# Patient Record
Sex: MALE | Race: WHITE | Employment: OTHER | URBAN - METROPOLITAN AREA
[De-identification: names, ages, dates, MRNs, and addresses within clinical notes are randomized per-mention and may not be internally consistent; named-entity substitution may affect disease eponyms.]

---

## 2017-02-08 PROBLEM — E11.9 DIABETES MELLITUS TYPE 2 WITHOUT RETINOPATHY (HCC): Status: ACTIVE | Noted: 2017-02-08

## 2017-05-15 PROCEDURE — 86200 CCP ANTIBODY: CPT | Performed by: INTERNAL MEDICINE

## 2017-05-18 PROCEDURE — 87070 CULTURE OTHR SPECIMN AEROBIC: CPT | Performed by: SURGERY

## 2017-05-18 PROCEDURE — 87075 CULTR BACTERIA EXCEPT BLOOD: CPT | Performed by: SURGERY

## 2017-05-18 PROCEDURE — 82570 ASSAY OF URINE CREATININE: CPT | Performed by: INTERNAL MEDICINE

## 2017-05-18 PROCEDURE — 87186 SC STD MICRODIL/AGAR DIL: CPT | Performed by: SURGERY

## 2017-05-18 PROCEDURE — 82043 UR ALBUMIN QUANTITATIVE: CPT | Performed by: INTERNAL MEDICINE

## 2017-05-18 PROCEDURE — 87205 SMEAR GRAM STAIN: CPT | Performed by: SURGERY

## 2017-05-18 PROCEDURE — 87077 CULTURE AEROBIC IDENTIFY: CPT | Performed by: SURGERY

## 2017-06-08 PROCEDURE — 81015 MICROSCOPIC EXAM OF URINE: CPT | Performed by: UROLOGY

## 2017-09-12 ENCOUNTER — OFFICE VISIT (OUTPATIENT)
Dept: FAMILY MEDICINE CLINIC | Facility: CLINIC | Age: 57
End: 2017-09-12

## 2017-09-12 ENCOUNTER — HOSPITAL ENCOUNTER (OUTPATIENT)
Age: 57
Discharge: HOME | End: 2017-09-12
Attending: FAMILY MEDICINE
Payer: COMMERCIAL

## 2017-09-12 ENCOUNTER — APPOINTMENT (OUTPATIENT)
Dept: GENERAL RADIOLOGY | Age: 57
End: 2017-09-12
Attending: FAMILY MEDICINE
Payer: COMMERCIAL

## 2017-09-12 VITALS
SYSTOLIC BLOOD PRESSURE: 171 MMHG | BODY MASS INDEX: 34.07 KG/M2 | DIASTOLIC BLOOD PRESSURE: 90 MMHG | WEIGHT: 230 LBS | HEIGHT: 69 IN | HEART RATE: 92 BPM | OXYGEN SATURATION: 96 % | TEMPERATURE: 98 F | RESPIRATION RATE: 18 BRPM

## 2017-09-12 DIAGNOSIS — Z02.9 ENCOUNTERS FOR ADMINISTRATIVE PURPOSE: Primary | ICD-10-CM

## 2017-09-12 DIAGNOSIS — M17.11 ARTHRITIS OF KNEE, RIGHT: ICD-10-CM

## 2017-09-12 DIAGNOSIS — Z96.642 HISTORY OF TOTAL LEFT HIP REPLACEMENT: ICD-10-CM

## 2017-09-12 DIAGNOSIS — Z98.890 S/P SHOULDER SURGERY: ICD-10-CM

## 2017-09-12 DIAGNOSIS — M25.461 KNEE EFFUSION, RIGHT: Primary | ICD-10-CM

## 2017-09-12 LAB
#LYMPHOCYTE IC: 2.5 X10ˆ3/UL (ref 0.9–3.2)
#MXD IC: 1.3 X10ˆ3/UL (ref 0.1–1)
#NEUTROPHIL IC: 8.5 X10ˆ3/UL (ref 1.3–6.7)
HCT IC: 41.6 % (ref 37–54)
HGB IC: 14.4 G/DL (ref 13–17)
LYMPHOCYTES NFR BLD AUTO: 20.4 %
MCH IC: 32.2 PG (ref 27–33.2)
MCHC IC: 34.6 G/DL (ref 31–37)
MCV IC: 93.1 FL (ref 80–99)
MIXED CELL %: 10.5 %
NEUTROPHILS NFR BLD AUTO: 69.1 %
PLT IC: 317 X10ˆ3/UL (ref 150–450)
RBC IC: 4.47 X10ˆ6/UL (ref 4.3–5.7)
WBC IC: 12.3 X10ˆ3/UL (ref 4–13)

## 2017-09-12 PROCEDURE — 85025 COMPLETE CBC W/AUTO DIFF WBC: CPT | Performed by: FAMILY MEDICINE

## 2017-09-12 PROCEDURE — 99214 OFFICE O/P EST MOD 30 MIN: CPT

## 2017-09-12 PROCEDURE — 73560 X-RAY EXAM OF KNEE 1 OR 2: CPT | Performed by: FAMILY MEDICINE

## 2017-09-12 PROCEDURE — 99213 OFFICE O/P EST LOW 20 MIN: CPT

## 2017-09-12 PROCEDURE — 36415 COLL VENOUS BLD VENIPUNCTURE: CPT

## 2017-09-12 RX ORDER — PREDNISONE 20 MG/1
20 TABLET ORAL 2 TIMES DAILY
Qty: 14 TABLET | Refills: 0 | Status: SHIPPED | OUTPATIENT
Start: 2017-09-12 | End: 2017-09-19

## 2017-09-12 NOTE — PROGRESS NOTES
Patient reports in mild distress with CC right anterior and medial knee pain, one day duration, with no known incident.  Observation of right anterior knee reveals swelling/erythema compared to left, tenderness in patellar region, no popliteal tenderness, n

## 2017-09-12 NOTE — ED PROVIDER NOTES
Patient Seen in: [facility]    History   Patient presents with:  Pain (neurologic)    Stated Complaint: Left knee pain x 1 day    HPI    With a past medical history given for left hip arthritis and tendinitis.   He is status Surgeon:                Georgia Macias MD;  Location: [address] [place] [address],[address]  8/26/2014: [address] <5MM TRUNK,ARM,LEG Right      Comment: Procedure: EXCISION OF LEG MASS;  Surgeon:                Georgia Macias MD;  Location: [facility] Pos swelling. Positive effusion,TTP to ant,lateral and medial knee  Full Extension w/o pain, Full Flexion with pain on extreme movement  Neg Ant Drawer, Neg Post Drawer.   Exam limited secondary to swelling  Neg Edson, Neg Apley  Extremity: popliteal pul shoulder surgery  Arthritis of knee, right    Disposition:  Discharge    Follow-up:  Celia Vanegas MD  [address] [facility] [phone]    Call      Vicente Osorio, [address] [facility] [id number]  [phone]

## 2024-01-05 ENCOUNTER — NEW PATIENT (OUTPATIENT)
Dept: URBAN - METROPOLITAN AREA CLINIC 14 | Facility: CLINIC | Age: 64
End: 2024-01-05

## 2024-01-05 DIAGNOSIS — H02.834: ICD-10-CM

## 2024-01-05 DIAGNOSIS — H02.831: ICD-10-CM

## 2024-01-05 PROCEDURE — 92285 EXTERNAL OCULAR PHOTOGRAPHY: CPT

## 2024-01-05 PROCEDURE — 99203 OFFICE O/P NEW LOW 30 MIN: CPT

## 2024-01-05 ASSESSMENT — VISUAL ACUITY
OD_CC: 20/20-1
OS_CC: 20/25